# Patient Record
Sex: FEMALE | Race: ASIAN | Employment: UNEMPLOYED | URBAN - METROPOLITAN AREA
[De-identification: names, ages, dates, MRNs, and addresses within clinical notes are randomized per-mention and may not be internally consistent; named-entity substitution may affect disease eponyms.]

---

## 2018-02-20 ENCOUNTER — HOSPITAL ENCOUNTER (OUTPATIENT)
Dept: CT IMAGING | Age: 38
Discharge: HOME | End: 2018-02-20
Attending: FAMILY MEDICINE
Payer: MEDICAID

## 2018-02-20 DIAGNOSIS — R51.9 PERSISTENT HEADACHES: ICD-10-CM

## 2018-02-20 PROCEDURE — 70450 CT HEAD/BRAIN W/O DYE: CPT | Performed by: FAMILY MEDICINE

## 2018-03-03 ENCOUNTER — HOSPITAL ENCOUNTER (OUTPATIENT)
Dept: GENERAL RADIOLOGY | Age: 38
Discharge: HOME | End: 2018-03-03
Attending: FAMILY MEDICINE
Payer: MEDICAID

## 2018-03-03 DIAGNOSIS — M26.623 TENDERNESS OF BOTH TEMPOROMANDIBULAR JOINTS: ICD-10-CM

## 2018-03-03 PROCEDURE — 70330 X-RAY EXAM OF JAW JOINTS: CPT | Performed by: FAMILY MEDICINE

## 2018-10-04 ENCOUNTER — CHARTING TRANS (OUTPATIENT)
Dept: OTHER | Age: 38
End: 2018-10-04

## 2018-10-06 ENCOUNTER — CHARTING TRANS (OUTPATIENT)
Dept: OTHER | Age: 38
End: 2018-10-06

## 2019-03-11 ENCOUNTER — HOSPITAL ENCOUNTER (OUTPATIENT)
Dept: GENERAL RADIOLOGY | Age: 39
Discharge: HOME | End: 2019-03-11
Attending: FAMILY MEDICINE
Payer: MEDICAID

## 2019-03-11 DIAGNOSIS — S30.0XXA CONTUSION OF SACROCOCCYGEAL REGION, INITIAL ENCOUNTER: ICD-10-CM

## 2019-03-11 PROCEDURE — 72220 X-RAY EXAM SACRUM TAILBONE: CPT | Performed by: FAMILY MEDICINE

## 2019-11-22 ENCOUNTER — WALK IN (OUTPATIENT)
Dept: URGENT CARE | Age: 39
End: 2019-11-22

## 2019-11-22 DIAGNOSIS — Z23 NEED FOR VACCINATION: Primary | ICD-10-CM

## 2019-11-22 PROCEDURE — 90688 IIV4 VACCINE SPLT 0.5 ML IM: CPT | Performed by: NURSE PRACTITIONER

## 2020-10-08 ENCOUNTER — TELEPHONE (OUTPATIENT)
Dept: SCHEDULING | Age: 40
End: 2020-10-08

## 2020-10-10 ENCOUNTER — IMMUNIZATION (OUTPATIENT)
Dept: URGENT CARE | Age: 40
End: 2020-10-10

## 2020-10-10 VITALS — TEMPERATURE: 97.3 F

## 2020-10-10 DIAGNOSIS — Z23 NEED FOR VACCINATION: ICD-10-CM

## 2020-10-10 PROCEDURE — 90686 IIV4 VACC NO PRSV 0.5 ML IM: CPT | Performed by: NURSE PRACTITIONER

## 2020-10-10 PROCEDURE — 90471 IMMUNIZATION ADMIN: CPT | Performed by: NURSE PRACTITIONER

## 2021-01-04 ENCOUNTER — HOSPITAL ENCOUNTER (OUTPATIENT)
Dept: GENERAL RADIOLOGY | Age: 41
Discharge: HOME | End: 2021-01-04
Attending: FAMILY MEDICINE
Payer: MEDICAID

## 2021-01-04 DIAGNOSIS — R52 PAIN: ICD-10-CM

## 2021-01-04 PROCEDURE — 73560 X-RAY EXAM OF KNEE 1 OR 2: CPT | Performed by: FAMILY MEDICINE

## 2021-07-06 ENCOUNTER — HOSPITAL ENCOUNTER (OUTPATIENT)
Dept: GENERAL RADIOLOGY | Age: 41
Discharge: HOME | End: 2021-07-06
Attending: FAMILY MEDICINE
Payer: MEDICAID

## 2021-07-06 DIAGNOSIS — S99.922A INJURY OF TOE ON LEFT FOOT: ICD-10-CM

## 2021-07-06 PROCEDURE — 73630 X-RAY EXAM OF FOOT: CPT | Performed by: FAMILY MEDICINE

## 2021-07-14 ENCOUNTER — HOSPITAL ENCOUNTER (OUTPATIENT)
Age: 41
Discharge: HOME | End: 2021-07-14
Payer: MEDICAID

## 2021-07-14 VITALS
SYSTOLIC BLOOD PRESSURE: 123 MMHG | RESPIRATION RATE: 18 BRPM | OXYGEN SATURATION: 99 % | TEMPERATURE: 99 F | HEART RATE: 72 BPM | DIASTOLIC BLOOD PRESSURE: 87 MMHG

## 2021-07-14 DIAGNOSIS — N92.1 MENORRHAGIA WITH IRREGULAR CYCLE: Primary | ICD-10-CM

## 2021-07-14 LAB
#MXD IC: 0.5 X10ˆ3/UL (ref 0.1–1)
B-HCG UR QL: NEGATIVE
HCT VFR BLD AUTO: 38.1 %
HGB BLD-MCNC: 11.9 G/DL
LYMPHOCYTES # BLD AUTO: 2.3 X10ˆ3/UL (ref 1–4)
LYMPHOCYTES NFR BLD AUTO: 34.1 %
MCH RBC QN AUTO: 26.3 PG (ref 26–34)
MCHC RBC AUTO-ENTMCNC: 31.2 G/DL (ref 31–37)
MCV RBC AUTO: 84.1 FL (ref 80–100)
MIXED CELL %: 7.8 %
NEUTROPHILS # BLD AUTO: 3.8 X10ˆ3/UL (ref 1.5–7.7)
NEUTROPHILS NFR BLD AUTO: 58.1 %
PLATELET # BLD AUTO: 329 X10ˆ3/UL (ref 150–450)
POCT BILIRUBIN URINE: NEGATIVE
POCT GLUCOSE URINE: NEGATIVE MG/DL
POCT KETONE URINE: NEGATIVE MG/DL
POCT LEUKOCYTE ESTERASE URINE: NEGATIVE
POCT NITRITE URINE: NEGATIVE
POCT PH URINE: 6.5 (ref 5–8)
POCT PROTEIN URINE: NEGATIVE MG/DL
POCT SPECIFIC GRAVITY URINE: 1.02
POCT UROBILINOGEN URINE: 0.2 MG/DL
RBC # BLD AUTO: 4.53 X10ˆ6/UL
WBC # BLD AUTO: 6.6 X10ˆ3/UL (ref 4–11)

## 2021-07-14 PROCEDURE — 85025 COMPLETE CBC W/AUTO DIFF WBC: CPT | Performed by: PHYSICIAN ASSISTANT

## 2021-07-14 PROCEDURE — 81025 URINE PREGNANCY TEST: CPT

## 2021-07-14 PROCEDURE — 99203 OFFICE O/P NEW LOW 30 MIN: CPT

## 2021-07-14 PROCEDURE — 99213 OFFICE O/P EST LOW 20 MIN: CPT

## 2021-07-14 PROCEDURE — 81002 URINALYSIS NONAUTO W/O SCOPE: CPT | Performed by: PHYSICIAN ASSISTANT

## 2021-07-14 PROCEDURE — 36415 COLL VENOUS BLD VENIPUNCTURE: CPT

## 2021-07-14 NOTE — ED INITIAL ASSESSMENT (HPI)
Pt began a period of spotting 6/29, and July 4 th bleeding got very heavy and she has been bleeding ever since.   She has been having heavy clotting, but has had no dizziness or lightheadedness

## 2021-07-14 NOTE — ED PROVIDER NOTES
Patient Seen in: [facility]      History   Patient presents with:  Vaginal Bleeding    Stated Complaint: eval/g    HPI/Subjective:   HPI    47yo female who comes in today stating that on June 29 she had an episode of heavy bleeding and th lesions. Vaginal canal: no lesions. Bright red blood in vaginal vault no clots no hemorrhaging, cervical os closed, negative CMT, bimanual exam is unremarkable  Musculoskeletal: Normal range of motion. No edema.    Neurological: Alert and oriented to pers [facility]  [phone]                Medications Prescribed:  Current Discharge Medication List    I have given the patient instructions regarding her diagnosis, Leeroy Millan

## 2023-10-04 PROBLEM — R05.1 ACUTE COUGH: Status: ACTIVE | Noted: 2023-10-04

## 2023-10-04 PROBLEM — J02.9 SORETHROAT: Status: ACTIVE | Noted: 2023-10-04

## 2025-01-25 ENCOUNTER — HOSPITAL ENCOUNTER (OUTPATIENT)
Age: 45
Discharge: HOME | End: 2025-01-25
Attending: EMERGENCY MEDICINE
Payer: MEDICAID

## 2025-01-25 VITALS
DIASTOLIC BLOOD PRESSURE: 83 MMHG | HEART RATE: 84 BPM | BODY MASS INDEX: 24.54 KG/M2 | RESPIRATION RATE: 20 BRPM | HEIGHT: 60 IN | TEMPERATURE: 98 F | SYSTOLIC BLOOD PRESSURE: 128 MMHG | OXYGEN SATURATION: 100 % | WEIGHT: 125 LBS

## 2025-01-25 DIAGNOSIS — J11.1 INFLUENZA: Primary | ICD-10-CM

## 2025-01-25 LAB
POCT INFLUENZA A: NEGATIVE
POCT INFLUENZA B: NEGATIVE
SARS-COV-2 RNA RESP QL NAA+PROBE: NOT DETECTED

## 2025-01-25 PROCEDURE — 87502 INFLUENZA DNA AMP PROBE: CPT | Performed by: EMERGENCY MEDICINE

## 2025-01-25 PROCEDURE — 99203 OFFICE O/P NEW LOW 30 MIN: CPT

## 2025-01-25 RX ORDER — OSELTAMIVIR PHOSPHATE 75 MG/1
75 CAPSULE ORAL 2 TIMES DAILY
Qty: 10 CAPSULE | Refills: 0 | Status: SHIPPED | OUTPATIENT
Start: 2025-01-25 | End: 2025-01-30

## 2025-01-25 NOTE — ED PROVIDER NOTES
Patient Seen in: [facility]      History   No chief complaint on file.    Stated Complaint: 1/4 Cold Symp    Subjective:   HPI      Mother presents with her  and 2 children all with the same symptoms.  Daughter's illness started Thursday.  She started yesterday.  She has had stuffy nose, congestion, and cough.  She reports that her illness is not very severe but that other family movers have been more sick.    Objective:     No pertinent past medical history.            No pertinent past surgical history.              No pertinent social history.            Review of Systems    Positive for stated complaint: 1/4 Cold Symp  Other systems are as noted in HPI.  Constitutional and vital signs reviewed.      All other systems reviewed and negative except as noted above.    Physical Exam     ED Triage Vitals [01/25/25 1417]   /83   Pulse 84   Resp 20   Temp 98.4 °F (36.9 °C)   Temp src Oral   SpO2 100 %   O2 Device None (Room air)       Current Vitals:   Vital Signs  BP: 128/83  Pulse: 84  Resp: 20  Temp: 98.4 °F (36.9 °C)  Temp src: Oral    Oxygen Therapy  SpO2: 100 %  O2 Device: None (Room air)        Physical Exam  General: The patient is awake, alert, conversant.  She is speaking full clear sentences and has normal pulse oximetry.  No coughing while I am in the room  Eyes: sclera white, conjunctiva pink and moist.  Lids and lashes are normal.  Throat: Posterior pharynx is nonerythematous.  Oromucosa lips are moist  Lungs: Clear to auscultation bilaterally.  No rhonchi or rales.  Skin: Not particularly pale  Neurologic:  Mental status as above.  Patient moves all extremities with good strength and coordination.        ED Course     Labs Reviewed   RAPID SARS-COV-2 BY PCR - Normal   POCT FLU TEST - Normal    Narrative:     This assay is a rapid molecular in vitro test utilizing nucleic acid amplification of influenza A and B viral RNA.                   MDM     Patient presents with 3  other family members complaining of similar illness with stuffy nose, congestion, and cough.  Viral syndrome such as influenza or COVID including the differential.  Patient reports that she is tolerating the illness well in comparison to other family members    Flu swab negative  COVID test negative   however, patient's  tested positive for flu and her symptoms are typical.  I will treat with Tamiflu        I recommend supportive care  Push fluids  Rest  Tylenol alternating with ibuprofen every 3-4 hours as needed for fever/chills/body ache  Over-the-counter daytime/nighttime cold and flu medications may help      Contact your primary care doctor today to arrange close follow-up    Medical Decision Making      Disposition and Plan     Clinical Impression:  1. Influenza         Disposition:  Discharge  1/25/2025  2:58 pm    Follow-up:  Girish Pacheco MD  [address] [facility] [address]  [phone]    Call in 2 days            Medications Prescribed:  Current Discharge Medication List        START taking these medications    Details   oseltamivir (TAMIFLU) 75 MG Oral Cap Take 1 capsule (75 mg total) by mouth 2 (two) times daily for 5 days.  Qty: 10 capsule, Refills: 0                 Supplementary Documentation:

## 2025-01-25 NOTE — DISCHARGE INSTRUCTIONS
Push fluids  Rest  Tylenol alternating with ibuprofen every 3-4 hours as needed for fever/chills/body ache  Over-the-counter daytime/nighttime cold and flu medications may help      Contact your primary care doctor today to arrange close follow-up